# Patient Record
Sex: MALE | Race: WHITE | NOT HISPANIC OR LATINO | URBAN - METROPOLITAN AREA
[De-identification: names, ages, dates, MRNs, and addresses within clinical notes are randomized per-mention and may not be internally consistent; named-entity substitution may affect disease eponyms.]

---

## 2017-09-20 ENCOUNTER — NEW PATIENT (OUTPATIENT)
Dept: URBAN - METROPOLITAN AREA CLINIC 51 | Facility: CLINIC | Age: 67
End: 2017-09-20
Payer: MEDICARE

## 2017-09-20 DIAGNOSIS — H52.213 IRREGULAR ASTIGMATISM, BILATERAL: ICD-10-CM

## 2017-09-20 DIAGNOSIS — Z96.1 PRESENCE OF INTRAOCULAR LENS: ICD-10-CM

## 2017-09-20 DIAGNOSIS — H35.371 PUCKERING OF MACULA, RIGHT EYE: ICD-10-CM

## 2017-09-20 DIAGNOSIS — H40.1413 CAPSLR GLAUCOMA W/PSUEDOXF LENS, RIGHT EYE, SEVERE STAGE: Primary | ICD-10-CM

## 2017-09-20 DIAGNOSIS — H25.12 AGE-RELATED NUCLEAR CATARACT, LEFT EYE: ICD-10-CM

## 2017-09-20 DIAGNOSIS — H43.811 VITREOUS DEGENERATION, RIGHT EYE: ICD-10-CM

## 2017-09-20 PROCEDURE — 92025 CPTRIZED CORNEAL TOPOGRAPHY: CPT | Performed by: OPTOMETRIST

## 2017-09-20 PROCEDURE — 92004 COMPRE OPH EXAM NEW PT 1/>: CPT | Performed by: OPTOMETRIST

## 2017-09-20 PROCEDURE — 92133 CPTRZD OPH DX IMG PST SGM ON: CPT | Performed by: OPTOMETRIST

## 2017-09-20 ASSESSMENT — INTRAOCULAR PRESSURE
OS: 12
OD: 19

## 2017-09-20 ASSESSMENT — VISUAL ACUITY
OD: 20/20
OS: 20/20

## 2017-09-20 ASSESSMENT — KERATOMETRY
OD: 45.95
OS: 46.40

## 2017-10-19 ENCOUNTER — CONSULT (OUTPATIENT)
Dept: URBAN - METROPOLITAN AREA CLINIC 51 | Facility: CLINIC | Age: 67
End: 2017-10-19
Payer: MEDICARE

## 2017-10-19 PROCEDURE — 92014 COMPRE OPH EXAM EST PT 1/>: CPT | Performed by: OPHTHALMOLOGY

## 2017-10-19 PROCEDURE — 76514 ECHO EXAM OF EYE THICKNESS: CPT | Performed by: OPHTHALMOLOGY

## 2017-10-19 PROCEDURE — 92083 EXTENDED VISUAL FIELD XM: CPT | Performed by: OPHTHALMOLOGY

## 2017-10-19 ASSESSMENT — INTRAOCULAR PRESSURE
OS: 14
OD: 14

## 2018-03-21 ENCOUNTER — FOLLOW UP ESTABLISHED (OUTPATIENT)
Dept: URBAN - METROPOLITAN AREA CLINIC 51 | Facility: CLINIC | Age: 68
End: 2018-03-21
Payer: MEDICARE

## 2018-03-21 PROCEDURE — 92020 GONIOSCOPY: CPT | Performed by: OPHTHALMOLOGY

## 2018-03-21 PROCEDURE — 92012 INTRM OPH EXAM EST PATIENT: CPT | Performed by: OPHTHALMOLOGY

## 2018-03-21 ASSESSMENT — INTRAOCULAR PRESSURE
OD: 32
OS: 18

## 2018-03-23 ENCOUNTER — CONSULT (OUTPATIENT)
Dept: URBAN - METROPOLITAN AREA CLINIC 51 | Facility: CLINIC | Age: 68
End: 2018-03-23
Payer: MEDICARE

## 2018-03-23 DIAGNOSIS — H40.1133 PRIMARY OPEN-ANGLE GLAUCOMA, BILATERAL, SEVERE STAGE: Primary | ICD-10-CM

## 2018-03-23 PROCEDURE — 92014 COMPRE OPH EXAM EST PT 1/>: CPT | Performed by: OPHTHALMOLOGY

## 2018-03-23 ASSESSMENT — INTRAOCULAR PRESSURE
OD: 32
OS: 17

## 2018-03-27 ENCOUNTER — FOLLOW UP ESTABLISHED (OUTPATIENT)
Dept: URBAN - METROPOLITAN AREA CLINIC 51 | Facility: CLINIC | Age: 68
End: 2018-03-27
Payer: MEDICARE

## 2018-03-27 DIAGNOSIS — H57.11 OCULAR PAIN, RIGHT EYE: Primary | ICD-10-CM

## 2018-03-27 PROCEDURE — 92012 INTRM OPH EXAM EST PATIENT: CPT | Performed by: OPTOMETRIST

## 2018-03-27 RX ORDER — OFLOXACIN 3 MG/ML
0.3 % SOLUTION/ DROPS OPHTHALMIC
Qty: 1 | Refills: 0 | Status: INACTIVE
Start: 2018-03-27 | End: 2018-03-30

## 2018-03-27 RX ORDER — AMOXICILLIN AND CLAVULANATE POTASSIUM 500; 125 MG/1; 1/1
TABLET, FILM COATED ORAL
Qty: 14 | Refills: 0 | Status: INACTIVE
Start: 2018-03-27 | End: 2018-04-26

## 2018-03-30 ENCOUNTER — FOLLOW UP ESTABLISHED (OUTPATIENT)
Dept: URBAN - METROPOLITAN AREA CLINIC 51 | Facility: CLINIC | Age: 68
End: 2018-03-30
Payer: MEDICARE

## 2018-03-30 DIAGNOSIS — L03.213 PERIORBITAL CELLULITIS: Primary | ICD-10-CM

## 2018-03-30 PROCEDURE — 92012 INTRM OPH EXAM EST PATIENT: CPT | Performed by: OPHTHALMOLOGY

## 2018-03-30 ASSESSMENT — INTRAOCULAR PRESSURE
OS: 14
OD: 22

## 2018-04-26 ENCOUNTER — Encounter (OUTPATIENT)
Dept: URBAN - METROPOLITAN AREA CLINIC 51 | Facility: CLINIC | Age: 68
End: 2018-04-26
Payer: MEDICARE

## 2018-04-26 DIAGNOSIS — Z01.818 ENCOUNTER FOR OTHER PREPROCEDURAL EXAMINATION: Primary | ICD-10-CM

## 2018-04-26 PROCEDURE — 99213 OFFICE O/P EST LOW 20 MIN: CPT | Performed by: PHYSICIAN ASSISTANT

## 2018-05-02 ENCOUNTER — Encounter (OUTPATIENT)
Dept: URBAN - METROPOLITAN AREA CLINIC 51 | Facility: CLINIC | Age: 68
End: 2018-05-02
Payer: MEDICARE

## 2018-05-10 ENCOUNTER — SURGERY (OUTPATIENT)
Dept: URBAN - METROPOLITAN AREA SURGERY 19 | Facility: SURGERY | Age: 68
End: 2018-05-10
Payer: MEDICARE

## 2018-05-10 PROCEDURE — 66250 FOLLOW-UP SURGERY OF EYE: CPT | Performed by: OPHTHALMOLOGY

## 2018-05-11 ENCOUNTER — POST OP (OUTPATIENT)
Dept: URBAN - METROPOLITAN AREA CLINIC 10 | Facility: CLINIC | Age: 68
End: 2018-05-11

## 2018-05-11 PROCEDURE — 99024 POSTOP FOLLOW-UP VISIT: CPT | Performed by: OPHTHALMOLOGY

## 2018-05-11 ASSESSMENT — INTRAOCULAR PRESSURE
OS: 17
OD: 8

## 2018-05-18 ENCOUNTER — POST OP (OUTPATIENT)
Dept: URBAN - METROPOLITAN AREA CLINIC 51 | Facility: CLINIC | Age: 68
End: 2018-05-18
Payer: MEDICARE

## 2018-05-18 PROCEDURE — 92012 INTRM OPH EXAM EST PATIENT: CPT | Performed by: OPHTHALMOLOGY

## 2018-05-18 RX ORDER — BRIMONIDINE TARTRATE 1 MG/ML
0.1 % SOLUTION/ DROPS OPHTHALMIC
Qty: 3 | Refills: 1 | Status: INACTIVE
Start: 2018-05-18 | End: 2018-05-18

## 2018-05-18 RX ORDER — BRIMONIDINE TARTRATE 1 MG/ML
0.1 % SOLUTION/ DROPS OPHTHALMIC
Qty: 3 | Refills: 1 | Status: INACTIVE
Start: 2018-05-18 | End: 2019-06-12

## 2018-05-18 RX ORDER — BIMATOPROST 0.1 MG/ML
0.01 % SOLUTION/ DROPS OPHTHALMIC
Qty: 3 | Refills: 1 | Status: INACTIVE
Start: 2018-05-18 | End: 2019-06-12

## 2018-05-18 ASSESSMENT — INTRAOCULAR PRESSURE
OS: 16
OD: 4

## 2018-06-01 ENCOUNTER — FOLLOW UP ESTABLISHED (OUTPATIENT)
Dept: URBAN - METROPOLITAN AREA CLINIC 51 | Facility: CLINIC | Age: 68
End: 2018-06-01
Payer: MEDICARE

## 2018-06-01 PROCEDURE — 99024 POSTOP FOLLOW-UP VISIT: CPT | Performed by: OPHTHALMOLOGY

## 2018-06-01 ASSESSMENT — INTRAOCULAR PRESSURE
OD: 8
OS: 19

## 2018-06-14 ENCOUNTER — FOLLOW UP ESTABLISHED (OUTPATIENT)
Dept: URBAN - METROPOLITAN AREA CLINIC 44 | Facility: CLINIC | Age: 68
End: 2018-06-14

## 2018-06-14 PROCEDURE — 99024 POSTOP FOLLOW-UP VISIT: CPT | Performed by: OPHTHALMOLOGY

## 2018-06-14 ASSESSMENT — INTRAOCULAR PRESSURE
OD: 6
OD: 11
OS: 15

## 2018-06-22 ENCOUNTER — FOLLOW UP ESTABLISHED (OUTPATIENT)
Dept: URBAN - METROPOLITAN AREA CLINIC 51 | Facility: CLINIC | Age: 68
End: 2018-06-22
Payer: MEDICARE

## 2018-06-22 PROCEDURE — 99024 POSTOP FOLLOW-UP VISIT: CPT | Performed by: OPHTHALMOLOGY

## 2018-06-22 ASSESSMENT — INTRAOCULAR PRESSURE
OD: 7
OS: 12

## 2018-06-29 ENCOUNTER — FOLLOW UP ESTABLISHED (OUTPATIENT)
Dept: URBAN - METROPOLITAN AREA CLINIC 51 | Facility: CLINIC | Age: 68
End: 2018-06-29
Payer: MEDICARE

## 2018-06-29 PROCEDURE — 99024 POSTOP FOLLOW-UP VISIT: CPT | Performed by: OPHTHALMOLOGY

## 2018-07-09 ENCOUNTER — POST OP (OUTPATIENT)
Dept: URBAN - METROPOLITAN AREA CLINIC 44 | Facility: CLINIC | Age: 68
End: 2018-07-09

## 2018-07-09 PROCEDURE — 99024 POSTOP FOLLOW-UP VISIT: CPT | Performed by: OPHTHALMOLOGY

## 2018-07-09 ASSESSMENT — INTRAOCULAR PRESSURE
OS: 12
OD: 6

## 2018-08-10 ENCOUNTER — POST OP (OUTPATIENT)
Dept: URBAN - METROPOLITAN AREA CLINIC 51 | Facility: CLINIC | Age: 68
End: 2018-08-10
Payer: MEDICARE

## 2018-08-10 DIAGNOSIS — Z98.83 FILTERING (VITREOUS) BLEB AFTER GLAUCOMA SURGERY STATUS: Primary | ICD-10-CM

## 2018-08-10 PROCEDURE — 92083 EXTENDED VISUAL FIELD XM: CPT | Performed by: OPHTHALMOLOGY

## 2018-08-10 PROCEDURE — 99024 POSTOP FOLLOW-UP VISIT: CPT | Performed by: OPHTHALMOLOGY

## 2018-08-10 ASSESSMENT — INTRAOCULAR PRESSURE
OD: 7
OS: 12

## 2018-11-02 ENCOUNTER — FOLLOW UP ESTABLISHED (OUTPATIENT)
Dept: URBAN - METROPOLITAN AREA CLINIC 51 | Facility: CLINIC | Age: 68
End: 2018-11-02
Payer: MEDICARE

## 2018-11-02 PROCEDURE — 92012 INTRM OPH EXAM EST PATIENT: CPT | Performed by: OPHTHALMOLOGY

## 2018-11-02 PROCEDURE — 92083 EXTENDED VISUAL FIELD XM: CPT | Performed by: OPHTHALMOLOGY

## 2018-11-02 RX ORDER — DORZOLAMIDE HCL 20 MG/ML
2 % SOLUTION/ DROPS OPHTHALMIC
Qty: 1 | Refills: 5 | Status: INACTIVE
Start: 2018-11-02 | End: 2019-06-04

## 2018-11-02 ASSESSMENT — INTRAOCULAR PRESSURE
OS: 13
OD: 10

## 2019-02-01 ENCOUNTER — FOLLOW UP ESTABLISHED (OUTPATIENT)
Dept: URBAN - METROPOLITAN AREA CLINIC 51 | Facility: CLINIC | Age: 69
End: 2019-02-01
Payer: MEDICARE

## 2019-02-01 PROCEDURE — 92082 INTERMEDIATE VISUAL FIELD XM: CPT | Performed by: OPHTHALMOLOGY

## 2019-02-01 PROCEDURE — 92012 INTRM OPH EXAM EST PATIENT: CPT | Performed by: OPHTHALMOLOGY

## 2019-02-01 ASSESSMENT — VISUAL ACUITY
OD: 20/20
OS: 20/20

## 2019-02-01 ASSESSMENT — INTRAOCULAR PRESSURE
OS: 9
OD: 11

## 2019-06-04 ENCOUNTER — FOLLOW UP ESTABLISHED (OUTPATIENT)
Dept: URBAN - METROPOLITAN AREA CLINIC 44 | Facility: CLINIC | Age: 69
End: 2019-06-04
Payer: MEDICARE

## 2019-06-04 PROCEDURE — 92083 EXTENDED VISUAL FIELD XM: CPT | Performed by: OPHTHALMOLOGY

## 2019-06-04 PROCEDURE — 92014 COMPRE OPH EXAM EST PT 1/>: CPT | Performed by: OPHTHALMOLOGY

## 2019-06-04 RX ORDER — OFLOXACIN 3 MG/ML
0.3 % SOLUTION/ DROPS OPHTHALMIC
Qty: 1 | Refills: 1 | Status: INACTIVE
Start: 2019-06-04 | End: 2019-08-01

## 2019-06-04 RX ORDER — PREDNISOLONE ACETATE 10 MG/ML
1 % SUSPENSION/ DROPS OPHTHALMIC
Qty: 1 | Refills: 1 | Status: INACTIVE
Start: 2019-06-04 | End: 2019-06-12

## 2019-06-04 RX ORDER — DORZOLAMIDE HCL 20 MG/ML
2 % SOLUTION/ DROPS OPHTHALMIC
Qty: 1 | Refills: 5 | Status: INACTIVE
Start: 2019-06-04 | End: 2019-06-12

## 2019-06-04 ASSESSMENT — INTRAOCULAR PRESSURE
OS: 13
OD: 22

## 2019-06-04 ASSESSMENT — VISUAL ACUITY
OS: 20/25
OD: 20/20

## 2019-06-04 ASSESSMENT — KERATOMETRY
OD: 45.25
OS: 45.38

## 2019-06-07 ENCOUNTER — Encounter (OUTPATIENT)
Dept: URBAN - METROPOLITAN AREA CLINIC 44 | Facility: CLINIC | Age: 69
End: 2019-06-07
Payer: MEDICARE

## 2019-06-07 PROCEDURE — 99213 OFFICE O/P EST LOW 20 MIN: CPT | Performed by: PHYSICIAN ASSISTANT

## 2019-06-11 ENCOUNTER — SURGERY (OUTPATIENT)
Dept: URBAN - METROPOLITAN AREA SURGERY 5 | Facility: SURGERY | Age: 69
End: 2019-06-11
Payer: MEDICARE

## 2019-06-11 PROCEDURE — 66250 FOLLOW-UP SURGERY OF EYE: CPT | Performed by: OPHTHALMOLOGY

## 2019-06-11 PROCEDURE — 66180 AQUEOUS SHUNT EYE W/GRAFT: CPT | Performed by: OPHTHALMOLOGY

## 2019-06-12 ENCOUNTER — POST OP (OUTPATIENT)
Dept: URBAN - METROPOLITAN AREA CLINIC 10 | Facility: CLINIC | Age: 69
End: 2019-06-12
Payer: MEDICARE

## 2019-06-12 PROCEDURE — 92071 CONTACT LENS FITTING FOR TX: CPT | Performed by: OPHTHALMOLOGY

## 2019-06-12 PROCEDURE — 99024 POSTOP FOLLOW-UP VISIT: CPT | Performed by: OPHTHALMOLOGY

## 2019-06-12 RX ORDER — DORZOLAMIDE HCL 20 MG/ML
2 % SOLUTION/ DROPS OPHTHALMIC
Qty: 1 | Refills: 5 | Status: INACTIVE
Start: 2019-06-12 | End: 2019-12-03

## 2019-06-12 RX ORDER — BRIMONIDINE TARTRATE 1 MG/ML
0.1 % SOLUTION/ DROPS OPHTHALMIC
Qty: 3 | Refills: 1 | Status: INACTIVE
Start: 2019-06-12 | End: 2019-08-01

## 2019-06-12 RX ORDER — BIMATOPROST 0.1 MG/ML
0.01 % SOLUTION/ DROPS OPHTHALMIC
Qty: 3 | Refills: 1 | Status: INACTIVE
Start: 2019-06-12 | End: 2019-07-16

## 2019-06-12 ASSESSMENT — INTRAOCULAR PRESSURE
OS: 15
OD: 10

## 2019-06-18 ENCOUNTER — POST OP (OUTPATIENT)
Dept: URBAN - METROPOLITAN AREA CLINIC 44 | Facility: CLINIC | Age: 69
End: 2019-06-18

## 2019-06-18 PROCEDURE — 99024 POSTOP FOLLOW-UP VISIT: CPT | Performed by: OPHTHALMOLOGY

## 2019-06-18 ASSESSMENT — VISUAL ACUITY: OD: 20/50

## 2019-06-24 ENCOUNTER — FOLLOW UP ESTABLISHED (OUTPATIENT)
Dept: URBAN - METROPOLITAN AREA CLINIC 44 | Facility: CLINIC | Age: 69
End: 2019-06-24

## 2019-06-24 PROCEDURE — 99024 POSTOP FOLLOW-UP VISIT: CPT | Performed by: OPHTHALMOLOGY

## 2019-06-24 ASSESSMENT — INTRAOCULAR PRESSURE
OD: 6
OS: 10

## 2019-07-08 ENCOUNTER — POST OP (OUTPATIENT)
Dept: URBAN - METROPOLITAN AREA CLINIC 44 | Facility: CLINIC | Age: 69
End: 2019-07-08

## 2019-07-08 PROCEDURE — 99024 POSTOP FOLLOW-UP VISIT: CPT | Performed by: OPHTHALMOLOGY

## 2019-07-08 ASSESSMENT — INTRAOCULAR PRESSURE
OS: 11
OD: 35

## 2019-07-16 ENCOUNTER — POST OP (OUTPATIENT)
Dept: URBAN - METROPOLITAN AREA CLINIC 44 | Facility: CLINIC | Age: 69
End: 2019-07-16

## 2019-07-16 PROCEDURE — 99024 POSTOP FOLLOW-UP VISIT: CPT | Performed by: OPHTHALMOLOGY

## 2019-07-16 RX ORDER — BIMATOPROST 0.1 MG/ML
0.01 % SOLUTION/ DROPS OPHTHALMIC
Qty: 3 | Refills: 1 | Status: INACTIVE
Start: 2019-07-16 | End: 2019-12-03

## 2019-07-16 ASSESSMENT — INTRAOCULAR PRESSURE
OS: 14
OD: 36

## 2019-07-18 ENCOUNTER — FOLLOW UP ESTABLISHED (OUTPATIENT)
Dept: URBAN - METROPOLITAN AREA CLINIC 44 | Facility: CLINIC | Age: 69
End: 2019-07-18

## 2019-07-18 PROCEDURE — 99024 POSTOP FOLLOW-UP VISIT: CPT | Performed by: OPHTHALMOLOGY

## 2019-07-18 ASSESSMENT — INTRAOCULAR PRESSURE
OD: 14
OS: 14

## 2019-08-01 ENCOUNTER — FOLLOW UP ESTABLISHED (OUTPATIENT)
Dept: URBAN - METROPOLITAN AREA CLINIC 44 | Facility: CLINIC | Age: 69
End: 2019-08-01

## 2019-08-01 DIAGNOSIS — Z48.89 ENCOUNTER FOR OTHER SPECIFIED SURGICAL AFTERCARE: Primary | ICD-10-CM

## 2019-08-01 PROCEDURE — 99024 POSTOP FOLLOW-UP VISIT: CPT | Performed by: OPHTHALMOLOGY

## 2019-08-01 RX ORDER — NETARSUDIL 0.2 MG/ML
0.02 % SOLUTION/ DROPS OPHTHALMIC; TOPICAL
Qty: 1 | Refills: 5 | Status: INACTIVE
Start: 2019-08-01 | End: 2019-12-03

## 2019-08-01 RX ORDER — BRIMONIDINE TARTRATE, TIMOLOL MALEATE 2; 5 MG/ML; MG/ML
SOLUTION/ DROPS OPHTHALMIC
Qty: 3 | Refills: 1 | Status: INACTIVE
Start: 2019-08-01 | End: 2019-10-03

## 2019-08-01 RX ORDER — PREDNISOLONE ACETATE 10 MG/ML
1 % SUSPENSION/ DROPS OPHTHALMIC
Qty: 1 | Refills: 1 | Status: INACTIVE
Start: 2019-08-01 | End: 2019-09-10

## 2019-08-01 ASSESSMENT — INTRAOCULAR PRESSURE
OS: 16
OD: 18

## 2019-08-15 ENCOUNTER — FOLLOW UP ESTABLISHED (OUTPATIENT)
Dept: URBAN - METROPOLITAN AREA CLINIC 44 | Facility: CLINIC | Age: 69
End: 2019-08-15

## 2019-08-15 PROCEDURE — 99024 POSTOP FOLLOW-UP VISIT: CPT | Performed by: OPHTHALMOLOGY

## 2019-08-15 ASSESSMENT — INTRAOCULAR PRESSURE
OD: 18
OS: 12

## 2019-08-27 ENCOUNTER — POST OP (OUTPATIENT)
Dept: URBAN - METROPOLITAN AREA CLINIC 44 | Facility: CLINIC | Age: 69
End: 2019-08-27

## 2019-08-27 PROCEDURE — 99024 POSTOP FOLLOW-UP VISIT: CPT | Performed by: OPHTHALMOLOGY

## 2019-08-27 ASSESSMENT — INTRAOCULAR PRESSURE
OD: 14
OS: 11

## 2019-09-06 ENCOUNTER — FOLLOW UP ESTABLISHED (OUTPATIENT)
Dept: URBAN - METROPOLITAN AREA CLINIC 44 | Facility: CLINIC | Age: 69
End: 2019-09-06
Payer: MEDICARE

## 2019-09-06 PROCEDURE — 92012 INTRM OPH EXAM EST PATIENT: CPT | Performed by: OPTOMETRIST

## 2019-09-06 ASSESSMENT — INTRAOCULAR PRESSURE
OS: 16
OD: 22

## 2019-09-10 ENCOUNTER — FOLLOW UP ESTABLISHED (OUTPATIENT)
Dept: URBAN - METROPOLITAN AREA CLINIC 44 | Facility: CLINIC | Age: 69
End: 2019-09-10
Payer: MEDICARE

## 2019-09-10 DIAGNOSIS — H20.011 PRIMARY IRIDOCYCLITIS, RIGHT EYE: Primary | ICD-10-CM

## 2019-09-10 PROCEDURE — 92012 INTRM OPH EXAM EST PATIENT: CPT | Performed by: OPHTHALMOLOGY

## 2019-09-10 RX ORDER — PREDNISOLONE ACETATE 10 MG/ML
1 % SUSPENSION/ DROPS OPHTHALMIC
Qty: 1 | Refills: 1 | Status: INACTIVE
Start: 2019-09-10 | End: 2019-12-03

## 2019-09-10 ASSESSMENT — INTRAOCULAR PRESSURE
OD: 22
OS: 11

## 2019-10-03 ENCOUNTER — FOLLOW UP ESTABLISHED (OUTPATIENT)
Dept: URBAN - METROPOLITAN AREA CLINIC 44 | Facility: CLINIC | Age: 69
End: 2019-10-03
Payer: MEDICARE

## 2019-10-03 PROCEDURE — 92012 INTRM OPH EXAM EST PATIENT: CPT | Performed by: OPHTHALMOLOGY

## 2019-10-03 PROCEDURE — 92083 EXTENDED VISUAL FIELD XM: CPT | Performed by: OPHTHALMOLOGY

## 2019-10-03 RX ORDER — BRIMONIDINE TARTRATE, TIMOLOL MALEATE 2; 5 MG/ML; MG/ML
SOLUTION/ DROPS OPHTHALMIC
Qty: 3 | Refills: 1 | Status: INACTIVE
Start: 2019-10-03 | End: 2019-12-03

## 2019-10-03 ASSESSMENT — INTRAOCULAR PRESSURE
OD: 19
OS: 12

## 2019-12-03 ENCOUNTER — FOLLOW UP ESTABLISHED (OUTPATIENT)
Dept: URBAN - METROPOLITAN AREA CLINIC 44 | Facility: CLINIC | Age: 69
End: 2019-12-03
Payer: MEDICARE

## 2019-12-03 PROCEDURE — 92082 INTERMEDIATE VISUAL FIELD XM: CPT | Performed by: OPHTHALMOLOGY

## 2019-12-03 PROCEDURE — 92012 INTRM OPH EXAM EST PATIENT: CPT | Performed by: OPHTHALMOLOGY

## 2019-12-03 RX ORDER — TIMOLOL MALEATE 5 MG/ML
0.5 % SOLUTION/ DROPS OPHTHALMIC
Qty: 1 | Refills: 5 | Status: INACTIVE
Start: 2019-12-03 | End: 2020-08-03

## 2019-12-03 RX ORDER — BIMATOPROST 0.1 MG/ML
0.01 % SOLUTION/ DROPS OPHTHALMIC
Qty: 3 | Refills: 1 | Status: INACTIVE
Start: 2019-12-03 | End: 2020-08-03

## 2019-12-03 RX ORDER — DORZOLAMIDE HCL 20 MG/ML
2 % SOLUTION/ DROPS OPHTHALMIC
Qty: 1 | Refills: 5 | Status: INACTIVE
Start: 2019-12-03 | End: 2020-08-03

## 2019-12-03 RX ORDER — BRIMONIDINE TARTRATE 2 MG/ML
0.2 % SOLUTION/ DROPS OPHTHALMIC
Qty: 1 | Refills: 5 | Status: INACTIVE
Start: 2019-12-03 | End: 2020-08-03

## 2019-12-03 ASSESSMENT — INTRAOCULAR PRESSURE
OS: 10
OD: 21

## 2020-04-30 ENCOUNTER — FOLLOW UP ESTABLISHED (OUTPATIENT)
Dept: URBAN - METROPOLITAN AREA CLINIC 44 | Facility: CLINIC | Age: 70
End: 2020-04-30
Payer: MEDICARE

## 2020-04-30 PROCEDURE — 92012 INTRM OPH EXAM EST PATIENT: CPT | Performed by: OPTOMETRIST

## 2020-04-30 ASSESSMENT — INTRAOCULAR PRESSURE
OD: 19
OS: 10
OS: 11

## 2020-05-04 ENCOUNTER — CONSULT (OUTPATIENT)
Dept: URBAN - METROPOLITAN AREA CLINIC 44 | Facility: CLINIC | Age: 70
End: 2020-05-04
Payer: MEDICARE

## 2020-05-04 PROCEDURE — 92014 COMPRE OPH EXAM EST PT 1/>: CPT | Performed by: OPHTHALMOLOGY

## 2020-05-04 ASSESSMENT — INTRAOCULAR PRESSURE
OD: 14
OS: 18

## 2020-08-03 ENCOUNTER — FOLLOW UP ESTABLISHED (OUTPATIENT)
Dept: URBAN - METROPOLITAN AREA CLINIC 44 | Facility: CLINIC | Age: 70
End: 2020-08-03
Payer: MEDICARE

## 2020-08-03 PROCEDURE — 92083 EXTENDED VISUAL FIELD XM: CPT | Performed by: OPHTHALMOLOGY

## 2020-08-03 PROCEDURE — 92014 COMPRE OPH EXAM EST PT 1/>: CPT | Performed by: OPHTHALMOLOGY

## 2020-08-03 RX ORDER — BIMATOPROST 0.1 MG/ML
0.01 % SOLUTION/ DROPS OPHTHALMIC
Qty: 3 | Refills: 1 | Status: INACTIVE
Start: 2020-08-03 | End: 2020-11-30

## 2020-08-03 RX ORDER — BRIMONIDINE TARTRATE 2 MG/ML
0.2 % SOLUTION/ DROPS OPHTHALMIC
Qty: 1 | Refills: 5 | Status: INACTIVE
Start: 2020-08-03 | End: 2020-11-30

## 2020-08-03 RX ORDER — DORZOLAMIDE HCL 20 MG/ML
2 % SOLUTION/ DROPS OPHTHALMIC
Qty: 1 | Refills: 5 | Status: INACTIVE
Start: 2020-08-03 | End: 2020-11-30

## 2020-08-03 RX ORDER — TIMOLOL MALEATE 5 MG/ML
0.5 % SOLUTION/ DROPS OPHTHALMIC
Qty: 1 | Refills: 5 | Status: INACTIVE
Start: 2020-08-03 | End: 2020-11-30

## 2020-08-03 ASSESSMENT — INTRAOCULAR PRESSURE
OD: 19
OS: 11

## 2020-11-30 ENCOUNTER — FOLLOW UP ESTABLISHED (OUTPATIENT)
Dept: URBAN - METROPOLITAN AREA CLINIC 44 | Facility: CLINIC | Age: 70
End: 2020-11-30
Payer: MEDICARE

## 2020-11-30 PROCEDURE — 92083 EXTENDED VISUAL FIELD XM: CPT | Performed by: OPHTHALMOLOGY

## 2020-11-30 PROCEDURE — 92012 INTRM OPH EXAM EST PATIENT: CPT | Performed by: OPHTHALMOLOGY

## 2020-11-30 RX ORDER — TIMOLOL MALEATE 5 MG/ML
0.5 % SOLUTION/ DROPS OPHTHALMIC
Qty: 15 | Refills: 1 | Status: INACTIVE
Start: 2020-11-30 | End: 2021-02-25

## 2020-11-30 RX ORDER — BRIMONIDINE TARTRATE 2 MG/ML
0.2 % SOLUTION/ DROPS OPHTHALMIC
Qty: 15 | Refills: 1 | Status: INACTIVE
Start: 2020-11-30 | End: 2021-02-25

## 2020-11-30 RX ORDER — DORZOLAMIDE HCL 20 MG/ML
2 % SOLUTION/ DROPS OPHTHALMIC
Qty: 15 | Refills: 1 | Status: INACTIVE
Start: 2020-11-30 | End: 2021-02-25

## 2020-11-30 RX ORDER — BIMATOPROST 0.1 MG/ML
0.01 % SOLUTION/ DROPS OPHTHALMIC
Qty: 7.5 | Refills: 1 | Status: INACTIVE
Start: 2020-11-30 | End: 2021-02-25

## 2020-11-30 ASSESSMENT — INTRAOCULAR PRESSURE
OD: 20
OS: 11

## 2020-11-30 ASSESSMENT — VISUAL ACUITY
OS: 20/30
OD: 20/30

## 2021-02-25 ENCOUNTER — FOLLOW UP ESTABLISHED (OUTPATIENT)
Dept: URBAN - METROPOLITAN AREA CLINIC 44 | Facility: CLINIC | Age: 71
End: 2021-02-25
Payer: MEDICARE

## 2021-02-25 PROCEDURE — 99212 OFFICE O/P EST SF 10 MIN: CPT | Performed by: OPHTHALMOLOGY

## 2021-02-25 RX ORDER — BRIMONIDINE TARTRATE 2 MG/ML
0.2 % SOLUTION/ DROPS OPHTHALMIC
Qty: 15 | Refills: 1 | Status: INACTIVE
Start: 2021-02-25 | End: 2021-07-08

## 2021-02-25 RX ORDER — BIMATOPROST 0.1 MG/ML
0.01 % SOLUTION/ DROPS OPHTHALMIC
Qty: 7.5 | Refills: 1 | Status: INACTIVE
Start: 2021-02-25 | End: 2021-07-08

## 2021-02-25 RX ORDER — DORZOLAMIDE HCL 20 MG/ML
2 % SOLUTION/ DROPS OPHTHALMIC
Qty: 15 | Refills: 1 | Status: INACTIVE
Start: 2021-02-25 | End: 2021-07-08

## 2021-02-25 RX ORDER — TIMOLOL MALEATE 5 MG/ML
0.5 % SOLUTION/ DROPS OPHTHALMIC
Qty: 15 | Refills: 1 | Status: INACTIVE
Start: 2021-02-25 | End: 2021-07-08

## 2021-02-25 ASSESSMENT — INTRAOCULAR PRESSURE
OD: 21
OS: 12

## 2021-07-08 ENCOUNTER — OFFICE VISIT (OUTPATIENT)
Dept: URBAN - METROPOLITAN AREA CLINIC 44 | Facility: CLINIC | Age: 71
End: 2021-07-08
Payer: MEDICARE

## 2021-07-08 PROCEDURE — 99214 OFFICE O/P EST MOD 30 MIN: CPT | Performed by: OPHTHALMOLOGY

## 2021-07-08 PROCEDURE — 92083 EXTENDED VISUAL FIELD XM: CPT | Performed by: OPHTHALMOLOGY

## 2021-07-08 RX ORDER — PREDNISOLONE ACETATE 10 MG/ML
1 % SUSPENSION/ DROPS OPHTHALMIC
Qty: 10 | Refills: 1 | Status: INACTIVE
Start: 2021-07-08 | End: 2021-07-08

## 2021-07-08 RX ORDER — DORZOLAMIDE HCL 20 MG/ML
2 % SOLUTION/ DROPS OPHTHALMIC
Qty: 15 | Refills: 1 | Status: INACTIVE
Start: 2021-07-08 | End: 2021-11-04

## 2021-07-08 RX ORDER — BRIMONIDINE TARTRATE 2 MG/ML
0.2 % SOLUTION/ DROPS OPHTHALMIC
Qty: 15 | Refills: 1 | Status: INACTIVE
Start: 2021-07-08 | End: 2021-11-04

## 2021-07-08 RX ORDER — TIMOLOL MALEATE 5 MG/ML
0.5 % SOLUTION/ DROPS OPHTHALMIC
Qty: 15 | Refills: 1 | Status: INACTIVE
Start: 2021-07-08 | End: 2021-11-04

## 2021-07-08 RX ORDER — BIMATOPROST 0.1 MG/ML
0.01 % SOLUTION/ DROPS OPHTHALMIC
Qty: 7.5 | Refills: 1 | Status: INACTIVE
Start: 2021-07-08 | End: 2021-11-02

## 2021-07-08 ASSESSMENT — INTRAOCULAR PRESSURE
OD: 27
OS: 14

## 2021-07-08 NOTE — IMPRESSION/PLAN
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 Plan: VF stable but IOP up OD. Recommend MicroPulse MP3 Laser. RvBvA discussed with patient, including pain, bleeding, loss of vision, loss of eye, need for further surgery, double vision, retinal problems, infection, and inflammation. Although surgery is expected to improve the condition, the patient understands that there is a chance that the condition could worsen in the future. The procedure is being done in an attempt to preserve the current level of vision. Vision improvement is NOT expected. Explained to patient the procedure has about a 50% success rate and additional procedures may be necessary. Patient advised that ongoing uncontrolled glaucoma may result in permanent vision loss. All questions answered. The patient wishes to proceed with the laser. 
- Continue glaucoma medications as directed: Lumigan QHS OD, Timolol QAM OD, Dorzolamide BID OU and Brimonidine BID OD
- See [address] to schedule, H&P, MicroPulse Laser MP3 in the Right Eye, H&P, POD1 in general clinic and POW1&POM1 with Dr. Brunner. - PO instructions: Patient to continue all glaucoma drops as instructed by Dr. Brunner and start Prednisolone [id number], [id number], AVOn8gv and QDx1wk.

## 2021-07-09 ENCOUNTER — ADULT PHYSICAL (OUTPATIENT)
Dept: URBAN - METROPOLITAN AREA CLINIC 44 | Facility: CLINIC | Age: 71
End: 2021-07-09
Payer: MEDICARE

## 2021-07-09 PROCEDURE — 99213 OFFICE O/P EST LOW 20 MIN: CPT | Performed by: PHYSICIAN ASSISTANT

## 2021-07-19 ENCOUNTER — SURGERY (OUTPATIENT)
Dept: URBAN - METROPOLITAN AREA SURGERY 19 | Facility: SURGERY | Age: 71
End: 2021-07-19
Payer: MEDICARE

## 2021-07-19 PROCEDURE — 66170 GLAUCOMA SURGERY: CPT | Performed by: OPHTHALMOLOGY

## 2021-07-20 ENCOUNTER — POST-OPERATIVE VISIT (OUTPATIENT)
Dept: URBAN - METROPOLITAN AREA CLINIC 44 | Facility: CLINIC | Age: 71
End: 2021-07-20

## 2021-07-20 PROCEDURE — 99024 POSTOP FOLLOW-UP VISIT: CPT | Performed by: OPTOMETRIST

## 2021-07-20 ASSESSMENT — INTRAOCULAR PRESSURE: OD: 22

## 2021-07-20 NOTE — IMPRESSION/PLAN
Impression: S/P Diode Micropulse Laser Cyclophotocoagulation OD - 1 Day. Encounter for surgical aftercare following surgery on a sense organ  Z48.810.  Plan: continue all gtts

## 2021-07-26 ENCOUNTER — POST-OPERATIVE VISIT (OUTPATIENT)
Dept: URBAN - METROPOLITAN AREA CLINIC 44 | Facility: CLINIC | Age: 71
End: 2021-07-26
Payer: MEDICARE

## 2021-07-26 DIAGNOSIS — Z48.810 ENCOUNTER FOR SURGICAL AFTERCARE FOLLOWING SURGERY ON THE SENSE ORGANS: ICD-10-CM

## 2021-07-26 PROCEDURE — 99024 POSTOP FOLLOW-UP VISIT: CPT | Performed by: OPHTHALMOLOGY

## 2021-07-26 ASSESSMENT — INTRAOCULAR PRESSURE
OS: 14
OD: 21

## 2021-07-26 NOTE — IMPRESSION/PLAN
Impression: S/P Diode Micropulse Laser Cyclophotocoagulation OD - 7 Days. Encounter for other specified surgical aftercare  Z48.89. Plan: - Patient doing well. No activity restrictions. - Drop Instructions: TAPER Prednisolone TID x 1wk, BID x 1wk, QD x 1wk, then stop, CONTINUE  Lumigan QHS OD, Timolol QAM OD, Dorzolamide BID OU and Brimonidine BID OD.
- Return to clinic as scheduled.

## 2021-08-23 ENCOUNTER — POST-OPERATIVE VISIT (OUTPATIENT)
Dept: URBAN - METROPOLITAN AREA CLINIC 44 | Facility: CLINIC | Age: 71
End: 2021-08-23
Payer: MEDICARE

## 2021-08-23 PROCEDURE — 99024 POSTOP FOLLOW-UP VISIT: CPT | Performed by: OPHTHALMOLOGY

## 2021-08-23 ASSESSMENT — INTRAOCULAR PRESSURE
OD: 21
OS: 15

## 2021-08-23 NOTE — IMPRESSION/PLAN
Impression: S/P Diode Micropulse Laser Cyclophotocoagulation OD - 35 Days. Encounter for surgical aftercare following surgery on a sense organ  Z48.810. Plan: - Patient doing well.  
- Drop Instructions: CONTINUE  Lumigan QHS OD, Timolol QAM OD, Dorzolamide BID OU and Brimonidine BID OD.
- RTC in November for VF 10-2, DFE (tech to check IOP and dilate), NO OCT
Patient

## 2021-11-02 ENCOUNTER — OFFICE VISIT (OUTPATIENT)
Dept: URBAN - METROPOLITAN AREA CLINIC 44 | Facility: CLINIC | Age: 71
End: 2021-11-02
Payer: MEDICARE

## 2021-11-02 PROCEDURE — 92083 EXTENDED VISUAL FIELD XM: CPT | Performed by: OPHTHALMOLOGY

## 2021-11-02 PROCEDURE — 99212 OFFICE O/P EST SF 10 MIN: CPT | Performed by: OPHTHALMOLOGY

## 2021-11-02 RX ORDER — BIMATOPROST 0.1 MG/ML
0.01 % SOLUTION/ DROPS OPHTHALMIC
Qty: 7.5 | Refills: 1 | Status: INACTIVE
Start: 2021-11-02 | End: 2022-01-03

## 2021-11-02 ASSESSMENT — INTRAOCULAR PRESSURE
OD: 21
OS: 19

## 2021-11-02 NOTE — IMPRESSION/PLAN
Impression: Capslr glaucoma w/psuedoxf lens, bilateral, severe stage Plan: IOP OD stable, OS is a bit up, will add Lumigan to help lower IOP. Change Lumigan QHS to OU (from OD). Continue  Timolol QAM OD, Brimonidine BID OD, and Dorzolamide BID OU. Start Olopatadine QD OD to help with irritation RTC 2 months IOP with Dr Antoni Cobos or Wander Davis

## 2021-11-17 ENCOUNTER — OFFICE VISIT (OUTPATIENT)
Dept: URBAN - METROPOLITAN AREA CLINIC 44 | Facility: CLINIC | Age: 71
End: 2021-11-17
Payer: MEDICARE

## 2021-11-17 DIAGNOSIS — H52.4 PRESBYOPIA: Primary | ICD-10-CM

## 2021-11-17 ASSESSMENT — VISUAL ACUITY
OS: 20/30
OD: 20/30

## 2021-11-17 ASSESSMENT — KERATOMETRY
OD: 46.38
OS: 45.50

## 2022-01-03 ENCOUNTER — OFFICE VISIT (OUTPATIENT)
Dept: URBAN - METROPOLITAN AREA CLINIC 44 | Facility: CLINIC | Age: 72
End: 2022-01-03
Payer: MEDICARE

## 2022-01-03 DIAGNOSIS — H40.1433 CAPSULAR GLAUCOMA WITH PSEUDOEXFOLIATION OF LENS, BILATERAL, SEVERE STAGE: Primary | ICD-10-CM

## 2022-01-03 PROCEDURE — 99213 OFFICE O/P EST LOW 20 MIN: CPT | Performed by: OPTOMETRIST

## 2022-01-03 RX ORDER — DORZOLAMIDE HCL 20 MG/ML
2 % SOLUTION/ DROPS OPHTHALMIC
Qty: 15 | Refills: 3 | Status: ACTIVE
Start: 2022-01-03

## 2022-01-03 RX ORDER — OLOPATADINE HYDROCHLORIDE OPHTHALMIC 2 MG/ML
0.2 % SOLUTION OPHTHALMIC
Qty: 10 | Refills: 5 | Status: INACTIVE
Start: 2022-01-03 | End: 2022-04-06

## 2022-01-03 RX ORDER — BIMATOPROST 0.1 MG/ML
0.01 % SOLUTION/ DROPS OPHTHALMIC
Qty: 7.5 | Refills: 3 | Status: ACTIVE
Start: 2022-01-03

## 2022-01-03 RX ORDER — OLOPATADINE HYDROCHLORIDE OPHTHALMIC 2 MG/ML
0.2 % SOLUTION OPHTHALMIC
Qty: 10 | Refills: 5 | Status: INACTIVE
Start: 2022-01-03 | End: 2022-01-03

## 2022-01-03 RX ORDER — TIMOLOL MALEATE 5 MG/ML
0.5 % SOLUTION/ DROPS OPHTHALMIC
Qty: 15 | Refills: 3 | Status: ACTIVE
Start: 2022-01-03

## 2022-01-03 RX ORDER — BRIMONIDINE TARTRATE 2 MG/ML
0.2 % SOLUTION/ DROPS OPHTHALMIC
Qty: 15 | Refills: 3 | Status: ACTIVE
Start: 2022-01-03

## 2022-01-03 ASSESSMENT — INTRAOCULAR PRESSURE
OD: 21
OS: 15
OS: 14
OD: 20

## 2022-01-03 NOTE — IMPRESSION/PLAN
Impression: Capslr glaucoma w/psuedoxf lens, bilateral, severe stage Plan: IOP slightly elevated but stable OD since last visit with Dr. Serenity Jaimes (hx of trab/revision and micropulse), improved OS. Will continue Lumigan QHS OU, timolol QAM OD, brimonidine BID OD, dorzolamide BID OU.   
RTC 3 months for IOP check + 24-2 HVF

## 2022-04-06 ENCOUNTER — OFFICE VISIT (OUTPATIENT)
Dept: URBAN - METROPOLITAN AREA CLINIC 44 | Facility: CLINIC | Age: 72
End: 2022-04-06
Payer: MEDICARE

## 2022-04-06 PROCEDURE — 99214 OFFICE O/P EST MOD 30 MIN: CPT | Performed by: OPTOMETRIST

## 2022-04-06 ASSESSMENT — INTRAOCULAR PRESSURE
OS: 26
OD: 27
OD: 28
OS: 15

## 2022-04-06 NOTE — IMPRESSION/PLAN
Impression: Capslr glaucoma w/psuedoxf lens, bilateral, severe stage Plan: IOP elevated OD, okay OS Unable to get VF today Will continue Lumigan QHS OU, timolol QAM OD, brimonidine BID OD, dorzolamide BID OU -- patient reports good compliance See Glaucoma again for further evaluation

## 2022-06-06 ENCOUNTER — OFFICE VISIT (OUTPATIENT)
Dept: URBAN - METROPOLITAN AREA CLINIC 44 | Facility: CLINIC | Age: 72
End: 2022-06-06
Payer: MEDICARE

## 2022-06-06 DIAGNOSIS — D48.1 NEOPLASM OF UNCERTIAN BEHAVIOR OF EYELID: ICD-10-CM

## 2022-06-06 PROCEDURE — 99214 OFFICE O/P EST MOD 30 MIN: CPT | Performed by: OPHTHALMOLOGY

## 2022-06-06 PROCEDURE — 92083 EXTENDED VISUAL FIELD XM: CPT | Performed by: OPHTHALMOLOGY

## 2022-06-06 RX ORDER — BRIMONIDINE TARTRATE 2 MG/ML
0.2 % SOLUTION/ DROPS OPHTHALMIC
Qty: 15 | Refills: 3 | Status: ACTIVE
Start: 2022-06-06

## 2022-06-06 RX ORDER — DORZOLAMIDE HCL 20 MG/ML
2 % SOLUTION/ DROPS OPHTHALMIC
Qty: 15 | Refills: 3 | Status: ACTIVE
Start: 2022-06-06

## 2022-06-06 RX ORDER — BIMATOPROST 0.1 MG/ML
0.01 % SOLUTION/ DROPS OPHTHALMIC
Qty: 7.5 | Refills: 3 | Status: ACTIVE
Start: 2022-06-06

## 2022-06-06 RX ORDER — TIMOLOL MALEATE 5 MG/ML
0.5 % SOLUTION/ DROPS OPHTHALMIC
Qty: 15 | Refills: 3 | Status: ACTIVE
Start: 2022-06-06

## 2022-06-06 RX ORDER — OFLOXACIN 3 MG/ML
0.3 % SOLUTION/ DROPS OPHTHALMIC
Qty: 5 | Refills: 1 | Status: ACTIVE
Start: 2022-06-06

## 2022-06-06 RX ORDER — PREDNISOLONE ACETATE 10 MG/ML
1 % SUSPENSION/ DROPS OPHTHALMIC
Qty: 10 | Refills: 1 | Status: ACTIVE
Start: 2022-06-06

## 2022-06-06 ASSESSMENT — INTRAOCULAR PRESSURE
OS: 19
OD: 28

## 2022-06-06 NOTE — IMPRESSION/PLAN
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 Plan: Multiple lid lesions on BUL. Recommend patient to see a dermatologist to have them examined.

## 2022-06-06 NOTE — IMPRESSION/PLAN
Impression: Capslr glaucoma w/psuedoxf lens, bilateral, severe stage Plan: Visual field appears stable OU, but IOP OD is elevated OD. Recommend lowering IOP OD. Baerveldt Tube Shunt is recommended. Risks, benefits, alternatives to surgery discussed including pain, bleeding, loss of vision, loss of eye, need for further surgery, double vision, retinal problems, infection, and inflammation. Although surgery is expected to improve the condition, the patient understands it is possible that the condition could worsen in the future. The surgery is being done in an attempt to preserve the current level of vision; vision improvement is NOT expected. Glasses will be necessary for sharpest vision after surgery. Patient advised that ongoing uncontrolled glaucoma may result in permanent vision loss. Discussed in detail the commitment of post operative care, patient may need to travel to different offices. Discussed activity restrictions: no bending head below waist, rubbing the eye, straining or lifting over 10 lbs, stay out of deirdre, dirty areas and shield when sleeping until advised. All questions answered. The patient wishes to proceed with surgery. See Sunita Baca to schedule Baerveldt tube shunt in the RIGHT eye, H&P, AScan [if required],  POD1, POW1, POW3 and POW5.5-6.5.

- Educational material provided. - ERx'd Ofloxacin QID OD, and Prednisolone Acetate QID OD as requested. - Continue Glaucoma Drops: 
- Patient will continue glaucoma drops as directed until surgery. After surgery, patient will stop glaucoma drops ONLY in the surgical eye. Start surgical drops when eye patch is removed 5hrs after surgery.

## 2022-06-10 ENCOUNTER — ADULT PHYSICAL (OUTPATIENT)
Dept: URBAN - METROPOLITAN AREA CLINIC 44 | Facility: CLINIC | Age: 72
End: 2022-06-10
Payer: MEDICARE

## 2022-06-10 DIAGNOSIS — H40.1433 CAPSULAR GLAUCOMA WITH PSEUDOEXFOLIATION OF LENS, BILATERAL, SEVERE STAGE: ICD-10-CM

## 2022-06-10 DIAGNOSIS — Z01.818 ENCOUNTER FOR OTHER PREPROCEDURAL EXAMINATION: Primary | ICD-10-CM

## 2022-06-10 PROCEDURE — 99213 OFFICE O/P EST LOW 20 MIN: CPT | Performed by: PHYSICIAN ASSISTANT

## 2022-06-27 ENCOUNTER — SURGERY (OUTPATIENT)
Dept: URBAN - METROPOLITAN AREA SURGERY 19 | Facility: SURGERY | Age: 72
End: 2022-06-27
Payer: MEDICARE

## 2022-06-27 PROCEDURE — 66180 AQUEOUS SHUNT EYE W/GRAFT: CPT | Performed by: OPHTHALMOLOGY

## 2022-06-28 ENCOUNTER — POST-OPERATIVE VISIT (OUTPATIENT)
Dept: URBAN - METROPOLITAN AREA CLINIC 44 | Facility: CLINIC | Age: 72
End: 2022-06-28
Payer: MEDICARE

## 2022-06-28 DIAGNOSIS — Z48.810 ENCOUNTER FOR SURGICAL AFTERCARE FOLLOWING SURGERY ON A SENSE ORGAN: Primary | ICD-10-CM

## 2022-06-28 PROCEDURE — 99024 POSTOP FOLLOW-UP VISIT: CPT | Performed by: OPHTHALMOLOGY

## 2022-06-28 ASSESSMENT — INTRAOCULAR PRESSURE
OD: 28
OS: 18

## 2022-06-28 NOTE — IMPRESSION/PLAN
Impression: S/P Shunt:  Baerveldt OD - 1 Day. Encounter for surgical aftercare following surgery on a sense organ  Z48.810. Plan: - Doing well. Explained to patient that phaco/kdb os is an option after OD is healed. - Drop Instructions: START Prednisolone QID OD, Ofloxacin QID OD, CONTINUE Lumigan QHS OU, Dorzolamide BID OU, Timolol QAM OU, and Brimonidine BID OU.
- Discussed surgery in detail with patient. Post-Operative instructions reviewed with patient. Discussed activity restrictions including no bending head below waist, rubbing the eye, straining or lifting over 10 lbs. , stay out of deirdre, dirty areas and shield at night continue until further notice. Patient advised to call with any RSVP (redness, sensitivity to light, vision change, pain worse than today), call 24/7.
- RTC: as scheduled.

## 2022-07-07 ENCOUNTER — POST-OPERATIVE VISIT (OUTPATIENT)
Dept: URBAN - METROPOLITAN AREA CLINIC 44 | Facility: CLINIC | Age: 72
End: 2022-07-07
Payer: MEDICARE

## 2022-07-07 DIAGNOSIS — Z48.810 ENCOUNTER FOR SURGICAL AFTERCARE FOLLOWING SURGERY ON A SENSE ORGAN: Primary | ICD-10-CM

## 2022-07-07 PROCEDURE — 99024 POSTOP FOLLOW-UP VISIT: CPT | Performed by: OPHTHALMOLOGY

## 2022-07-07 ASSESSMENT — INTRAOCULAR PRESSURE
OS: 13
OD: 23

## 2022-07-07 NOTE — IMPRESSION/PLAN
Impression: S/P Shunt:  Baerveldt OD - 10 Days. Encounter for surgical aftercare following surgery on a sense organ  Z48.810. Plan: - Doing well. Explained to patient that phaco/kdb os is an option after OD is healed. - Drop Instructions: CONTINUE Prednisolone QID OD, Ofloxacin QID OD (for 1 more week), CONTINUE Lumigan QHS OU, Dorzolamide BID OU, Timolol QAM OU, and Brimonidine BID OU. Patient complains of irritation; after examination, looks to be due to stitch. Recommend patient to use QFT simon qhs.
- Activity restrictions still in place until further notice. Patient advised to call with any RSVP (redness, sensitivity to light, vision change, pain worse than today), call 24/7.
- RTC: as scheduled.

## 2022-07-18 ENCOUNTER — POST-OPERATIVE VISIT (OUTPATIENT)
Dept: URBAN - METROPOLITAN AREA CLINIC 44 | Facility: CLINIC | Age: 72
End: 2022-07-18
Payer: MEDICARE

## 2022-07-18 DIAGNOSIS — Z48.810 ENCOUNTER FOR SURGICAL AFTERCARE FOLLOWING SURGERY ON A SENSE ORGAN: Primary | ICD-10-CM

## 2022-07-18 PROCEDURE — 99024 POSTOP FOLLOW-UP VISIT: CPT | Performed by: OPHTHALMOLOGY

## 2022-07-18 ASSESSMENT — INTRAOCULAR PRESSURE
OD: 22
OS: 17

## 2022-07-18 NOTE — IMPRESSION/PLAN
Impression: S/P Shunt:  Baerveldt OD - 21 Days. Encounter for surgical aftercare following surgery on a sense organ  Z48.810. Plan: - IOP is doing okay, but expect it to get better as eye heals. Eye is slightly inflamed. - Drop Instructions: RESTART Prednisolone QD OD and CONTINUE Lumigan QHS OU, Dorzolamide BID OU, Timolol QAM OU, and Brimonidine BID OU. - Activity restrictions still in place until further notice. Patient advised to call with any RSVP (redness, sensitivity to light, vision change, pain worse than today), call 24/7.
- RTC: as scheduled.

## 2022-08-08 ENCOUNTER — POST-OPERATIVE VISIT (OUTPATIENT)
Dept: URBAN - METROPOLITAN AREA CLINIC 44 | Facility: CLINIC | Age: 72
End: 2022-08-08
Payer: MEDICARE

## 2022-08-08 DIAGNOSIS — Z48.810 ENCOUNTER FOR SURGICAL AFTERCARE FOLLOWING SURGERY ON A SENSE ORGAN: Primary | ICD-10-CM

## 2022-08-08 PROCEDURE — 99024 POSTOP FOLLOW-UP VISIT: CPT | Performed by: OPHTHALMOLOGY

## 2022-08-08 RX ORDER — PREDNISOLONE ACETATE 10 MG/ML
1 % SUSPENSION/ DROPS OPHTHALMIC
Qty: 10 | Refills: 1 | Status: ACTIVE
Start: 2022-08-08

## 2022-08-08 NOTE — IMPRESSION/PLAN
Impression: S/P Shunt:  Baerveldt OD - 42 Days. Encounter for surgical aftercare following surgery on a sense organ  Z48.810. Post operative instructions reviewed - Plan: Tube is open. - Drop Instructions: DISCONTINUE Lumigan, Brimonidine, Dorzolamide in the RIGHT EYE.  Will CONTINUE all other drops OS. 
- CONTINUE Timolol QAM OU
- START Pred QID OD
- Return in 2 weeks for PO IOP check

## 2022-09-01 ENCOUNTER — POST-OPERATIVE VISIT (OUTPATIENT)
Dept: URBAN - METROPOLITAN AREA CLINIC 44 | Facility: CLINIC | Age: 72
End: 2022-09-01
Payer: MEDICARE

## 2022-09-01 DIAGNOSIS — Z48.810 ENCOUNTER FOR SURGICAL AFTERCARE FOLLOWING SURGERY ON A SENSE ORGAN: Primary | ICD-10-CM

## 2022-09-01 PROCEDURE — 99024 POSTOP FOLLOW-UP VISIT: CPT | Performed by: OPHTHALMOLOGY

## 2022-09-01 ASSESSMENT — INTRAOCULAR PRESSURE
OS: 17
OD: 39

## 2022-09-01 NOTE — IMPRESSION/PLAN
Impression: S/P  Bleb Revision OD - 1178 Days. Encounter for surgical aftercare following surgery on a sense organ  Z48.810. Plan: IOP is elevated. Patient has not been taking Timolol. Will change drops. - Drop Instructions: CONTINUE Lumigan QHS OS, CHANGE Brimonidine BID from OS to OU, CHANGE Dorzolamide BID from OS to OU, Timolol BID OU, and TAPER Prednisolone from QID to TID OD. Will watch closely. No activity restrictions.  
RTC next week for PO

## 2022-09-08 ENCOUNTER — OFFICE VISIT (OUTPATIENT)
Dept: URBAN - METROPOLITAN AREA CLINIC 44 | Facility: CLINIC | Age: 72
End: 2022-09-08
Payer: MEDICARE

## 2022-09-08 DIAGNOSIS — Z48.810 ENCOUNTER FOR SURGICAL AFTERCARE FOLLOWING SURGERY ON A SENSE ORGAN: Primary | ICD-10-CM

## 2022-09-08 PROCEDURE — 99024 POSTOP FOLLOW-UP VISIT: CPT | Performed by: OPHTHALMOLOGY

## 2022-09-08 ASSESSMENT — INTRAOCULAR PRESSURE
OD: 21
OS: 12

## 2022-09-08 NOTE — IMPRESSION/PLAN
Impression: S/P  Bleb Revision OD - 1178 Days. Encounter for surgical aftercare following surgery on a sense organ  Z48.810. Plan: - IOP is improved. He is having some allergy to drops- recommend check IOP two weeks after PRed taper and if IOP ok try [address] one glaucoma drop at a time OD. Also could consider PF gtts. - CONTINUE Lumigan QHS OS,  Brimonidine BID OU, Dorzolamide BID OU, Timolol BID OU
- TAPER Prednisolone from BID x 1 wk then QD x 1 week then STOP. No activity restrictions.  
RTC 2 weeks for PO IOP  check with Glaucoma provider

## 2022-09-19 ENCOUNTER — POST-OPERATIVE VISIT (OUTPATIENT)
Dept: URBAN - METROPOLITAN AREA CLINIC 44 | Facility: CLINIC | Age: 72
End: 2022-09-19
Payer: MEDICARE

## 2022-09-19 DIAGNOSIS — Z48.810 ENCOUNTER FOR SURGICAL AFTERCARE FOLLOWING SURGERY ON A SENSE ORGAN: Primary | ICD-10-CM

## 2022-09-19 PROCEDURE — 99024 POSTOP FOLLOW-UP VISIT: CPT | Performed by: OPHTHALMOLOGY

## 2022-09-19 ASSESSMENT — INTRAOCULAR PRESSURE
OS: 17
OD: 17

## 2022-09-19 NOTE — IMPRESSION/PLAN
Impression: S/P  Tube Shunt OD - 1178 Days. Encounter for surgical aftercare following surgery on a sense organ  Z48.810. Plan: Pt has Glaucoma    Gonio :        Pachs:      Today's IOP :  17/17       Tmax  :  39/26 Target IOP low teens OU Pt denies Fhx of Glaucoma Left eye is the better seeing eye HVF 24-2 (07/08/21) small central island OD, inf loss OS 
C/D:  
OCT: 68/61 (09/20/17) Pt denies Sulfa Allergy   // Pt denies Lung /Heart dx Plan :
1. Continue Lumigan QHS OS Brimonidine BID OU Dorzolamide BID OU Timolol BID OU
- STOP Prednisolone OD Discussed details about Glaucoma and that without proper control of pressures irreversible blindness can occur. Patient understands risks. Emphasize compliance with drop and without compliance vision loss progression can occur. 2. IOP continues to improve but is not at goal 
3. IOP to continue to drop with stopping steroid - return in 1 month with optometry for IOP check and 2 months with DR. Sola Fletcher for RNFL/HVF OU

## 2022-10-19 ENCOUNTER — OFFICE VISIT (OUTPATIENT)
Dept: URBAN - METROPOLITAN AREA CLINIC 44 | Facility: CLINIC | Age: 72
End: 2022-10-19
Payer: MEDICARE

## 2022-10-19 DIAGNOSIS — Z48.810 ENCOUNTER FOR SURGICAL AFTERCARE FOLLOWING SURGERY ON A SENSE ORGAN: Primary | ICD-10-CM

## 2022-10-19 PROCEDURE — 99213 OFFICE O/P EST LOW 20 MIN: CPT | Performed by: OPTOMETRIST

## 2022-10-19 ASSESSMENT — INTRAOCULAR PRESSURE
OS: 12
OD: 14

## 2022-10-19 NOTE — IMPRESSION/PLAN
Impression: S/P  Tube Shunt OD - 1178 Days. Encounter for surgical aftercare following surgery on a sense organ  Z48.810. IOP 14, 12. IOL lower since D/C Pred. Doing well. Eye comfortable Plan: Pt has Glaucoma    Gonio :        Pachs:      Today's IOP :  17/17       Tmax  :  39/26 Target IOP low teens OU Pt denies Fhx of Glaucoma Left eye is the better seeing eye HVF 24-2 (07/08/21) small central island OD, inf loss OS 
C/D:  
OCT: 68/61 (09/20/17) Pt denies Sulfa Allergy   // Pt denies Lung /Heart dx Plan :
1. Continue Lumigan QHS OS, Brimonidine BID OU, Dorzolamide BID OU, Timolol BID OU. STOP Prednisolone OD. RTC as sched  with DR. Stephanie Guthrie for RNFL/HVF OU

## 2022-11-14 ENCOUNTER — OFFICE VISIT (OUTPATIENT)
Dept: URBAN - METROPOLITAN AREA CLINIC 44 | Facility: CLINIC | Age: 72
End: 2022-11-14
Payer: MEDICARE

## 2022-11-14 DIAGNOSIS — H40.1122 PRIMARY OPEN-ANGLE GLAUCOMA, MODERATE STAGE, LEFT EYE: ICD-10-CM

## 2022-11-14 DIAGNOSIS — H40.1113 PRIMARY OPEN-ANGLE GLAUCOMA, SEVERE STAGE, RIGHT EYE: Primary | ICD-10-CM

## 2022-11-14 DIAGNOSIS — Z88.9 ALLERGY STATUS TO MEDICATION: ICD-10-CM

## 2022-11-14 PROCEDURE — 99214 OFFICE O/P EST MOD 30 MIN: CPT | Performed by: OPHTHALMOLOGY

## 2022-11-14 PROCEDURE — 92083 EXTENDED VISUAL FIELD XM: CPT | Performed by: OPHTHALMOLOGY

## 2022-11-14 ASSESSMENT — INTRAOCULAR PRESSURE
OS: 11
OD: 11

## 2022-11-14 NOTE — IMPRESSION/PLAN
Impression: Primary open-angle glaucoma, moderate stage, left eye: H40.1122. Plan: See above assessment no

## 2022-11-14 NOTE — IMPRESSION/PLAN
Impression: Allergy status to medication: Z88.9. Plan: Patient presents with Dorzolamide allergy. Will have patient stop med. recommend Vaseline to lower lids as needed.

## 2022-11-14 NOTE — IMPRESSION/PLAN
Impression: Primary open-angle glaucoma, severe stage, right eye: H40.1113. Plan: Pt has Glaucoma    Gonio :        Pachs:      Today's IOP : 11 OU      Tmax  :  39/26 Target IOP low teens OU Pt denies Fhx of Glaucoma Left eye is the better seeing eye F 24-2 (07/08/21) small central island OD, inf loss OS 
C/D:  
OCT: 68/61 (09/20/17) Pt denies Sulfa Allergy   // Pt denies Lung /Heart dx Plan :
1. Continue Lumigan QHS OS Brimonidine BID OU Timolol BID OU
- STOP Dorzolamide  2/2 allergy Discussed details about Glaucoma and that without proper control of pressures irreversible blindness can occur. Patient understands risks. Emphasize compliance with drop and without compliance vision loss progression can occur. 
2. Return to Dr. Enoch Mendoza in 3 weeks for IOP check without dorzolamide (look for improvement on eyelids)

## 2022-12-06 ENCOUNTER — OFFICE VISIT (OUTPATIENT)
Dept: URBAN - METROPOLITAN AREA CLINIC 44 | Facility: CLINIC | Age: 72
End: 2022-12-06
Payer: MEDICARE

## 2022-12-06 DIAGNOSIS — H40.1113 PRIMARY OPEN-ANGLE GLAUCOMA, SEVERE STAGE, RIGHT EYE: Primary | ICD-10-CM

## 2022-12-06 DIAGNOSIS — Z88.9 ALLERGY STATUS TO MEDICATION: ICD-10-CM

## 2022-12-06 PROCEDURE — 99213 OFFICE O/P EST LOW 20 MIN: CPT | Performed by: OPTOMETRIST

## 2022-12-06 ASSESSMENT — INTRAOCULAR PRESSURE
OS: 21
OD: 22

## 2022-12-06 NOTE — IMPRESSION/PLAN
Impression: Allergy status to medication: Z88.9. Likely allergy to Dorzolamide. Lids better since D/C. Plan: PLAN: Continue with Vaseline to lower lids as needed.

## 2022-12-06 NOTE — IMPRESSION/PLAN
Impression: Primary open-angle glaucoma, severe stage, right eye: H40.1113. IOP= OD 21. OS 22. Tmax  :  39/26. Improvement of the lids OU. Pt denies Fhx of Glaucoma Left eye is the better seeing eye HVF 24-2 (07/08/21) small central island OD, inf loss OS 
C/D:  
OCT: 68/61 (09/20/17) Pt denies Sulfa Allergy   // Pt denies Lung /Heart dx Plan: PLAN: Discussed findings. IOP elevated OU. Target IOP low teens OU. Continue Lumigan QHS OS, Brimonidine BID OU, and Timolol BID OU.  RTC 4 weeks for IOP check

## 2023-01-03 ENCOUNTER — OFFICE VISIT (OUTPATIENT)
Dept: URBAN - METROPOLITAN AREA CLINIC 44 | Facility: CLINIC | Age: 73
End: 2023-01-03
Payer: MEDICARE

## 2023-01-03 DIAGNOSIS — H25.812 COMBINED FORMS OF AGE-RELATED CATARACT, LEFT EYE: ICD-10-CM

## 2023-01-03 DIAGNOSIS — H40.1122 PRIMARY OPEN-ANGLE GLAUCOMA, MODERATE STAGE, LEFT EYE: ICD-10-CM

## 2023-01-03 DIAGNOSIS — H40.1113 PRIMARY OPEN-ANGLE GLAUCOMA, SEVERE STAGE, RIGHT EYE: Primary | ICD-10-CM

## 2023-01-03 PROCEDURE — 99213 OFFICE O/P EST LOW 20 MIN: CPT | Performed by: OPTOMETRIST

## 2023-01-03 RX ORDER — BRIMONIDINE TARTRATE 2 MG/ML
0.2 % SOLUTION/ DROPS OPHTHALMIC
Qty: 15 | Refills: 3 | Status: ACTIVE
Start: 2023-01-03

## 2023-01-03 RX ORDER — BIMATOPROST 0.1 MG/ML
0.01 % SOLUTION/ DROPS OPHTHALMIC
Qty: 7.5 | Refills: 3 | Status: ACTIVE
Start: 2023-01-03

## 2023-01-03 RX ORDER — DORZOLAMIDE HCL 20 MG/ML
2 % SOLUTION/ DROPS OPHTHALMIC
Qty: 15 | Refills: 3 | Status: INACTIVE
Start: 2023-01-03 | End: 2023-01-03

## 2023-01-03 RX ORDER — TIMOLOL MALEATE 5 MG/ML
0.5 % SOLUTION/ DROPS OPHTHALMIC
Qty: 15 | Refills: 3 | Status: ACTIVE
Start: 2023-01-03

## 2023-01-03 ASSESSMENT — INTRAOCULAR PRESSURE
OD: 12
OS: 16

## 2023-01-03 ASSESSMENT — VISUAL ACUITY
OD: 20/300
OS: 20/40

## 2023-01-03 NOTE — IMPRESSION/PLAN
Impression: Combined forms of age-related cataract, left eye: H25.812. BCVA limited due to Cat OS Plan: PLAN: Continue to observe condition. RTC 4 months for complete exam complete + OCT (Mac) to reaccessed cataracts. RTC sooner if patient symptoms become worse.

## 2023-01-03 NOTE — IMPRESSION/PLAN
Impression: Primary open-angle glaucoma, severe stage, right eye: H40.1113. IOP= 12 Tmax: 39.
- Vertical cupping OD . 84 OD with RNFL loss (S,I), Average OD 68
-VF OD total loss (VFI 13% 11/22),
-No Fm Hx, Plan: PLAN: Target IOP low teens OU. Continue Lumigan QHS OS, Brimonidine BID OU, and Timolol BID OU.  RTC 4 months for 24-2 VF and complete + RNFL

## 2023-01-03 NOTE — IMPRESSION/PLAN
Impression: Primary open-angle glaucoma, moderate stage, left eye: H40.1122. IOP 16. TMax 26 - Vertical cupping OS . 81 OS with RNFL loss (S,I) Average OS 61 
-VF OS dense inf/nasal step defects (VFI 42% 11/22),
-No Fm Hx, Plan: See above assessment

## 2023-05-03 ENCOUNTER — OFFICE VISIT (OUTPATIENT)
Dept: URBAN - METROPOLITAN AREA CLINIC 44 | Facility: CLINIC | Age: 73
End: 2023-05-03
Payer: MEDICARE

## 2023-05-03 DIAGNOSIS — H40.1113 PRIMARY OPEN-ANGLE GLAUCOMA, SEVERE STAGE, RIGHT EYE: Primary | ICD-10-CM

## 2023-05-03 DIAGNOSIS — H26.491 OTHER SECONDARY CATARACT, RIGHT EYE: ICD-10-CM

## 2023-05-03 DIAGNOSIS — H40.1122 PRIMARY OPEN-ANGLE GLAUCOMA, MODERATE STAGE, LEFT EYE: ICD-10-CM

## 2023-05-03 DIAGNOSIS — H35.351 CYSTOID MACULAR DEGENERATION, RIGHT EYE: ICD-10-CM

## 2023-05-03 DIAGNOSIS — H25.812 COMBINED FORMS OF AGE-RELATED CATARACT, LEFT EYE: ICD-10-CM

## 2023-05-03 DIAGNOSIS — H04.123 TEAR FILM INSUFFICIENCY OF BILATERAL LACRIMAL GLANDS: ICD-10-CM

## 2023-05-03 PROCEDURE — 92083 EXTENDED VISUAL FIELD XM: CPT | Performed by: OPTOMETRIST

## 2023-05-03 PROCEDURE — 99214 OFFICE O/P EST MOD 30 MIN: CPT | Performed by: OPTOMETRIST

## 2023-05-03 PROCEDURE — 92133 CPTRZD OPH DX IMG PST SGM ON: CPT | Performed by: OPTOMETRIST

## 2023-05-03 RX ORDER — BIMATOPROST 0.1 MG/ML
0.01 % SOLUTION/ DROPS OPHTHALMIC
Qty: 7.5 | Refills: 3 | Status: ACTIVE
Start: 2023-05-03

## 2023-05-03 RX ORDER — BRIMONIDINE TARTRATE 2 MG/ML
0.2 % SOLUTION/ DROPS OPHTHALMIC
Qty: 15 | Refills: 3 | Status: ACTIVE
Start: 2023-05-03

## 2023-05-03 RX ORDER — TIMOLOL MALEATE 5 MG/ML
0.5 % SOLUTION/ DROPS OPHTHALMIC
Qty: 15 | Refills: 3 | Status: ACTIVE
Start: 2023-05-03

## 2023-05-03 ASSESSMENT — INTRAOCULAR PRESSURE
OD: 17
OS: 17

## 2023-05-03 ASSESSMENT — VISUAL ACUITY
OD: CF 3FT
OS: 20/30

## 2023-05-03 ASSESSMENT — KERATOMETRY
OS: 45.88
OD: 44.88

## 2023-05-03 NOTE — IMPRESSION/PLAN
Impression: Primary open-angle glaucoma, moderate stage, left eye: H40.1122. IOP 17 TMax 26 - Vertical cupping OS . 81 OS with RNFL loss (S,I) Average OS 61 
-VF OS total loss sparing sup (VFI 36% 5/23 vs 42% 11/22),
-No Fm Hx, Plan: See above assessment

## 2023-05-03 NOTE — IMPRESSION/PLAN
Impression: Cystoid macular degeneration, right eye: H35.351. Decreased VA. CRVO?? Plan: PLAN: Discussed findings. Rec retinal consult.

## 2023-05-03 NOTE — IMPRESSION/PLAN
Impression: Tear film insufficiency of bilateral lacrimal glands: H04.123. Plan: PLAN: Recommend Lipid based tears to be used 3-4X daily if symptomatic. PRN if no symptoms. Observe condition and RTC if symptom's worsen for further work up.

## 2023-05-03 NOTE — IMPRESSION/PLAN
Impression: Primary open-angle glaucoma, severe stage, right eye: H40.1113. IOP= 17 Tmax: 39.
- Vertical cupping OD . 76 OD with RNFL loss (i), Average  NOTE: Increase RNFL vs 9/20/17 likely due to CME
-VF OD total loss (VFI 9% 5/23 vs 13% 11/22),
-No Fm Hx, Plan: PLAN: Target IOP low teens OU. Continue Lumigan QHS OS, Brimonidine BID OU, and Timolol BID OU. Pt reporting dry skin around eyes and redness when using drops OU.  RTC for glaucoma consult due to IOP higher than target and possible sensitivity  to medications

## 2023-05-03 NOTE — IMPRESSION/PLAN
Impression: Combined forms of age-related cataract, left eye: H25.812. Visually significant/symptomatic cataracts. Patient reporting difficulty with PM vision and/or reading. BCVA OS CF. Glare OS CF. Plan: PLAN: Discussed findings and reviewed treatment options. Rec CE/IOL to improve vision. Patient elects to proceed with surgical treatment. Risks and benefits to be discussed by surgeon. Recommend surgery OS. Pupils dilated to 6 mm or greater. Patient candidate  for the following: Std IOL, toric, LenSx, ORA. Refractive goal to be discussed with surgeon. Patient understands they may need correction to achieve best vision. BCVA maybe limited by DED, floaters and severe glaucoma. Consider MIGs.  Rec Dr Pramod Rogers for CE/IOL

## 2023-05-03 NOTE — IMPRESSION/PLAN
Impression: Other secondary cataract, right eye: H26.491. Mild to moderate PCO.  BCVA limited Plan: PLAN: Observe

## 2023-11-10 ENCOUNTER — OFFICE VISIT (OUTPATIENT)
Dept: URBAN - METROPOLITAN AREA CLINIC 44 | Facility: CLINIC | Age: 73
End: 2023-11-10
Payer: MEDICARE

## 2023-11-10 DIAGNOSIS — H35.371 PUCKERING OF MACULA, RIGHT EYE: ICD-10-CM

## 2023-11-10 DIAGNOSIS — H40.1113 PRIMARY OPEN-ANGLE GLAUCOMA, SEVERE STAGE, RIGHT EYE: Primary | ICD-10-CM

## 2023-11-10 DIAGNOSIS — H25.812 COMBINED FORMS OF AGE-RELATED CATARACT, LEFT EYE: ICD-10-CM

## 2023-11-10 DIAGNOSIS — H40.1122 PRIMARY OPEN-ANGLE GLAUCOMA, MODERATE STAGE, LEFT EYE: ICD-10-CM

## 2023-11-10 PROCEDURE — 99214 OFFICE O/P EST MOD 30 MIN: CPT | Performed by: OPTOMETRIST

## 2023-11-10 PROCEDURE — 92133 CPTRZD OPH DX IMG PST SGM ON: CPT | Performed by: OPTOMETRIST

## 2023-11-10 ASSESSMENT — INTRAOCULAR PRESSURE
OS: 17
OD: 27

## 2024-01-29 ENCOUNTER — OFFICE VISIT (OUTPATIENT)
Dept: URBAN - METROPOLITAN AREA CLINIC 44 | Facility: CLINIC | Age: 74
End: 2024-01-29
Payer: MEDICARE

## 2024-01-29 DIAGNOSIS — H40.1122 PRIMARY OPEN-ANGLE GLAUCOMA, MODERATE STAGE, LEFT EYE: ICD-10-CM

## 2024-01-29 DIAGNOSIS — H40.1113 PRIMARY OPEN-ANGLE GLAUCOMA, SEVERE STAGE, RIGHT EYE: Primary | ICD-10-CM

## 2024-01-29 PROCEDURE — 99214 OFFICE O/P EST MOD 30 MIN: CPT | Performed by: OPHTHALMOLOGY

## 2024-01-29 PROCEDURE — 92020 GONIOSCOPY: CPT | Performed by: OPHTHALMOLOGY

## 2024-01-29 ASSESSMENT — INTRAOCULAR PRESSURE
OD: 20
OS: 19

## 2024-02-05 ENCOUNTER — TECH ONLY (OUTPATIENT)
Dept: URBAN - METROPOLITAN AREA CLINIC 44 | Facility: CLINIC | Age: 74
End: 2024-02-05
Payer: MEDICARE

## 2024-02-05 DIAGNOSIS — H25.12 AGE-RELATED NUCLEAR CATARACT, LEFT EYE: ICD-10-CM

## 2024-02-05 DIAGNOSIS — H25.812 COMBINED FORMS OF AGE-RELATED CATARACT, LEFT EYE: Primary | ICD-10-CM

## 2024-02-05 DIAGNOSIS — H40.1133 PRIMARY OPEN-ANGLE GLAUCOMA, BILATERAL, SEVERE STAGE: ICD-10-CM

## 2024-02-05 DIAGNOSIS — Z01.818 ENCOUNTER FOR OTHER PREPROCEDURAL EXAMINATION: Primary | ICD-10-CM

## 2024-02-05 PROCEDURE — 99213 OFFICE O/P EST LOW 20 MIN: CPT | Performed by: PHYSICIAN ASSISTANT

## 2024-02-05 ASSESSMENT — PACHYMETRY
OD: 5.25
OS: 23.34
OD: 23.52
OS: 3.11

## 2024-02-12 ENCOUNTER — SURGERY (OUTPATIENT)
Dept: URBAN - METROPOLITAN AREA SURGERY 19 | Facility: SURGERY | Age: 74
End: 2024-02-12
Payer: MEDICARE

## 2024-02-12 RX ORDER — OFLOXACIN 3 MG/ML
0.3 % SOLUTION/ DROPS OPHTHALMIC
Qty: 5 | Refills: 1 | Status: ACTIVE
Start: 2024-02-12

## 2024-02-12 RX ORDER — PREDNISOLONE ACETATE 10 MG/ML
1 % SUSPENSION/ DROPS OPHTHALMIC
Qty: 5 | Refills: 1 | Status: ACTIVE
Start: 2024-02-12

## 2024-02-13 ENCOUNTER — POST-OPERATIVE VISIT (OUTPATIENT)
Dept: URBAN - METROPOLITAN AREA CLINIC 44 | Facility: CLINIC | Age: 74
End: 2024-02-13
Payer: MEDICARE

## 2024-02-13 DIAGNOSIS — Z48.810 ENCOUNTER FOR SURGICAL AFTERCARE FOLLOWING SURGERY ON A SENSE ORGAN: Primary | ICD-10-CM

## 2024-02-13 PROCEDURE — 99024 POSTOP FOLLOW-UP VISIT: CPT | Performed by: OPTOMETRIST

## 2024-02-13 ASSESSMENT — INTRAOCULAR PRESSURE: OD: 20

## 2024-02-19 ENCOUNTER — OFFICE VISIT (OUTPATIENT)
Dept: URBAN - METROPOLITAN AREA CLINIC 44 | Facility: CLINIC | Age: 74
End: 2024-02-19
Payer: MEDICARE

## 2024-02-19 DIAGNOSIS — H40.1122 PRIMARY OPEN-ANGLE GLAUCOMA, MODERATE STAGE, LEFT EYE: ICD-10-CM

## 2024-02-19 DIAGNOSIS — H40.1113 PRIMARY OPEN-ANGLE GLAUCOMA, SEVERE STAGE, RIGHT EYE: Primary | ICD-10-CM

## 2024-02-19 DIAGNOSIS — Z48.810 ENCOUNTER FOR SURGICAL AFTERCARE FOLLOWING SURGERY ON A SENSE ORGAN: ICD-10-CM

## 2024-02-19 DIAGNOSIS — H25.812 COMBINED FORMS OF AGE-RELATED CATARACT, LEFT EYE: ICD-10-CM

## 2024-02-19 PROCEDURE — 99214 OFFICE O/P EST MOD 30 MIN: CPT | Performed by: OPHTHALMOLOGY

## 2024-02-19 PROCEDURE — 99024 POSTOP FOLLOW-UP VISIT: CPT | Performed by: OPHTHALMOLOGY

## 2024-02-19 ASSESSMENT — INTRAOCULAR PRESSURE
OD: 18
OS: 18

## 2024-03-05 ENCOUNTER — POST-OPERATIVE VISIT (OUTPATIENT)
Dept: URBAN - METROPOLITAN AREA CLINIC 44 | Facility: CLINIC | Age: 74
End: 2024-03-05
Payer: MEDICARE

## 2024-03-05 DIAGNOSIS — Z96.1 PRESENCE OF INTRAOCULAR LENS: Primary | ICD-10-CM

## 2024-03-05 PROCEDURE — 99024 POSTOP FOLLOW-UP VISIT: CPT | Performed by: OPTOMETRIST

## 2024-03-05 ASSESSMENT — INTRAOCULAR PRESSURE
OS: 23
OS: 22

## 2024-04-01 ENCOUNTER — POST-OPERATIVE VISIT (OUTPATIENT)
Dept: URBAN - METROPOLITAN AREA CLINIC 44 | Facility: CLINIC | Age: 74
End: 2024-04-01
Payer: MEDICARE

## 2024-04-01 DIAGNOSIS — H52.4 PRESBYOPIA: Primary | ICD-10-CM

## 2024-04-01 DIAGNOSIS — Z96.1 PRESENCE OF INTRAOCULAR LENS: ICD-10-CM

## 2024-04-01 PROCEDURE — 99024 POSTOP FOLLOW-UP VISIT: CPT | Performed by: OPTOMETRIST

## 2024-04-01 ASSESSMENT — KERATOMETRY
OS: 45.75
OD: 45.13

## 2024-04-01 ASSESSMENT — VISUAL ACUITY
OD: 20/50
OS: 20/25

## 2024-04-01 ASSESSMENT — INTRAOCULAR PRESSURE
OD: 22
OS: 18

## 2024-08-29 ENCOUNTER — OFFICE VISIT (OUTPATIENT)
Dept: URBAN - METROPOLITAN AREA CLINIC 44 | Facility: CLINIC | Age: 74
End: 2024-08-29
Payer: MEDICARE

## 2024-08-29 DIAGNOSIS — H40.1113 PRIMARY OPEN-ANGLE GLAUCOMA, SEVERE STAGE, RIGHT EYE: Primary | ICD-10-CM

## 2024-08-29 DIAGNOSIS — H40.1122 PRIMARY OPEN-ANGLE GLAUCOMA, MODERATE STAGE, LEFT EYE: ICD-10-CM

## 2024-08-29 PROCEDURE — 99214 OFFICE O/P EST MOD 30 MIN: CPT | Performed by: OPHTHALMOLOGY

## 2024-08-29 RX ORDER — PREDNISOLONE ACETATE 10 MG/ML
1 % SUSPENSION/ DROPS OPHTHALMIC
Qty: 10 | Refills: 1 | Status: ACTIVE
Start: 2024-08-29

## 2024-08-29 RX ORDER — OFLOXACIN 3 MG/ML
0.3 % SOLUTION/ DROPS OPHTHALMIC
Qty: 10 | Refills: 1 | Status: ACTIVE
Start: 2024-08-29

## 2024-08-29 ASSESSMENT — INTRAOCULAR PRESSURE
OD: 19
OS: 22

## 2024-10-01 ENCOUNTER — ADULT PHYSICAL (OUTPATIENT)
Dept: URBAN - METROPOLITAN AREA CLINIC 44 | Facility: CLINIC | Age: 74
End: 2024-10-01
Payer: MEDICARE

## 2024-10-01 DIAGNOSIS — H40.1133 PRIMARY OPEN-ANGLE GLAUCOMA, BILATERAL, SEVERE STAGE: ICD-10-CM

## 2024-10-01 DIAGNOSIS — Z01.818 ENCOUNTER FOR OTHER PREPROCEDURAL EXAMINATION: Primary | ICD-10-CM

## 2024-10-01 PROCEDURE — 99213 OFFICE O/P EST LOW 20 MIN: CPT | Performed by: PHYSICIAN ASSISTANT

## 2024-10-14 ENCOUNTER — SURGERY (OUTPATIENT)
Dept: URBAN - METROPOLITAN AREA SURGERY 19 | Facility: SURGERY | Age: 74
End: 2024-10-14
Payer: MEDICARE

## 2024-10-15 ENCOUNTER — POST-OPERATIVE VISIT (OUTPATIENT)
Dept: URBAN - METROPOLITAN AREA CLINIC 44 | Facility: CLINIC | Age: 74
End: 2024-10-15
Payer: MEDICARE

## 2024-10-15 DIAGNOSIS — Z48.810 ENCOUNTER FOR SURGICAL AFTERCARE FOLLOWING SURGERY ON A SENSE ORGAN: Primary | ICD-10-CM

## 2024-10-15 PROCEDURE — 99024 POSTOP FOLLOW-UP VISIT: CPT | Performed by: OPTOMETRIST

## 2024-10-15 ASSESSMENT — INTRAOCULAR PRESSURE
OD: 7
OD: 10

## 2024-10-22 ENCOUNTER — POST-OPERATIVE VISIT (OUTPATIENT)
Dept: URBAN - METROPOLITAN AREA CLINIC 44 | Facility: CLINIC | Age: 74
End: 2024-10-22
Payer: MEDICARE

## 2024-10-22 DIAGNOSIS — Z48.810 ENCOUNTER FOR SURGICAL AFTERCARE FOLLOWING SURGERY ON A SENSE ORGAN: Primary | ICD-10-CM

## 2024-10-22 PROCEDURE — 99024 POSTOP FOLLOW-UP VISIT: CPT | Performed by: OPTOMETRIST

## 2024-10-22 ASSESSMENT — INTRAOCULAR PRESSURE
OS: 19
OD: 20
OS: 17

## 2024-11-04 ENCOUNTER — SURGERY (OUTPATIENT)
Dept: URBAN - METROPOLITAN AREA SURGERY 19 | Facility: SURGERY | Age: 74
End: 2024-11-04
Payer: MEDICARE

## 2024-11-05 ENCOUNTER — POST-OPERATIVE VISIT (OUTPATIENT)
Dept: URBAN - METROPOLITAN AREA CLINIC 44 | Facility: CLINIC | Age: 74
End: 2024-11-05
Payer: MEDICARE

## 2024-11-05 DIAGNOSIS — Z48.810 ENCOUNTER FOR SURGICAL AFTERCARE FOLLOWING SURGERY ON A SENSE ORGAN: Primary | ICD-10-CM

## 2024-11-05 PROCEDURE — 99024 POSTOP FOLLOW-UP VISIT: CPT | Performed by: OPTOMETRIST

## 2024-11-05 ASSESSMENT — INTRAOCULAR PRESSURE
OS: 10
OS: 14

## 2024-11-08 ENCOUNTER — POST-OPERATIVE VISIT (OUTPATIENT)
Dept: URBAN - METROPOLITAN AREA CLINIC 44 | Facility: CLINIC | Age: 74
End: 2024-11-08
Payer: MEDICARE

## 2024-11-08 PROCEDURE — 99024 POSTOP FOLLOW-UP VISIT: CPT | Performed by: OPTOMETRIST

## 2024-11-08 ASSESSMENT — INTRAOCULAR PRESSURE
OD: 20
OS: 12

## 2024-12-02 ENCOUNTER — POST-OPERATIVE VISIT (OUTPATIENT)
Dept: URBAN - METROPOLITAN AREA CLINIC 44 | Facility: CLINIC | Age: 74
End: 2024-12-02
Payer: MEDICARE

## 2024-12-02 DIAGNOSIS — H40.1113 PRIMARY OPEN-ANGLE GLAUCOMA, SEVERE STAGE, RIGHT EYE: Primary | ICD-10-CM

## 2024-12-02 DIAGNOSIS — H40.1122 PRIMARY OPEN-ANGLE GLAUCOMA, MODERATE STAGE, LEFT EYE: ICD-10-CM

## 2024-12-02 PROCEDURE — 99024 POSTOP FOLLOW-UP VISIT: CPT | Performed by: OPHTHALMOLOGY

## 2024-12-02 ASSESSMENT — INTRAOCULAR PRESSURE
OS: 24
OD: 17

## 2025-01-02 ENCOUNTER — OFFICE VISIT (OUTPATIENT)
Dept: URBAN - METROPOLITAN AREA CLINIC 44 | Facility: CLINIC | Age: 75
End: 2025-01-02
Payer: MEDICARE

## 2025-01-02 DIAGNOSIS — H40.1122 PRIMARY OPEN-ANGLE GLAUCOMA, MODERATE STAGE, LEFT EYE: ICD-10-CM

## 2025-01-02 DIAGNOSIS — H40.1113 PRIMARY OPEN-ANGLE GLAUCOMA, RIGHT EYE, SEVERE STAGE: Primary | ICD-10-CM

## 2025-01-02 PROCEDURE — 92012 INTRM OPH EXAM EST PATIENT: CPT | Performed by: OPTOMETRIST

## 2025-01-02 PROCEDURE — 92133 CPTRZD OPH DX IMG PST SGM ON: CPT | Performed by: OPTOMETRIST

## 2025-01-02 ASSESSMENT — INTRAOCULAR PRESSURE
OD: 18
OS: 21
OS: 19
OD: 21

## 2025-03-03 ENCOUNTER — OFFICE VISIT (OUTPATIENT)
Dept: URBAN - METROPOLITAN AREA CLINIC 44 | Facility: CLINIC | Age: 75
End: 2025-03-03
Payer: MEDICARE

## 2025-03-03 DIAGNOSIS — H40.1113 PRIMARY OPEN-ANGLE GLAUCOMA, RIGHT EYE, SEVERE STAGE: Primary | ICD-10-CM

## 2025-03-03 ASSESSMENT — INTRAOCULAR PRESSURE
OD: 16
OS: 16

## 2025-03-17 ENCOUNTER — OFFICE VISIT (OUTPATIENT)
Dept: URBAN - METROPOLITAN AREA CLINIC 44 | Facility: CLINIC | Age: 75
End: 2025-03-17
Payer: MEDICARE

## 2025-03-17 DIAGNOSIS — H40.1113 PRIMARY OPEN-ANGLE GLAUCOMA, RIGHT EYE, SEVERE STAGE: Primary | ICD-10-CM

## 2025-03-17 DIAGNOSIS — H40.1122 PRIMARY OPEN-ANGLE GLAUCOMA, LEFT EYE, MODERATE STAGE: ICD-10-CM

## 2025-03-17 PROCEDURE — 66030 INJECTION TREATMENT OF EYE: CPT | Performed by: OPHTHALMOLOGY

## 2025-04-24 ENCOUNTER — OFFICE VISIT (OUTPATIENT)
Dept: URBAN - METROPOLITAN AREA CLINIC 44 | Facility: CLINIC | Age: 75
End: 2025-04-24
Payer: MEDICARE

## 2025-04-24 DIAGNOSIS — H40.1113 PRIMARY OPEN-ANGLE GLAUCOMA, RIGHT EYE, SEVERE STAGE: Primary | ICD-10-CM

## 2025-04-24 PROCEDURE — 92133 CPTRZD OPH DX IMG PST SGM ON: CPT | Performed by: OPTOMETRIST

## 2025-04-24 PROCEDURE — 92012 INTRM OPH EXAM EST PATIENT: CPT | Performed by: OPTOMETRIST

## 2025-04-24 ASSESSMENT — INTRAOCULAR PRESSURE
OD: 21
OS: 19

## 2025-06-23 ENCOUNTER — OFFICE VISIT (OUTPATIENT)
Dept: URBAN - METROPOLITAN AREA CLINIC 44 | Facility: CLINIC | Age: 75
End: 2025-06-23
Payer: MEDICARE

## 2025-06-23 DIAGNOSIS — H40.1113 PRIMARY OPEN-ANGLE GLAUCOMA, SEVERE STAGE, RIGHT EYE: Primary | ICD-10-CM

## 2025-06-23 DIAGNOSIS — H40.1122 PRIMARY OPEN-ANGLE GLAUCOMA, MODERATE STAGE, LEFT EYE: ICD-10-CM

## 2025-06-23 PROCEDURE — 99214 OFFICE O/P EST MOD 30 MIN: CPT | Performed by: OPHTHALMOLOGY

## 2025-06-23 PROCEDURE — 92083 EXTENDED VISUAL FIELD XM: CPT | Performed by: OPHTHALMOLOGY

## 2025-06-23 ASSESSMENT — INTRAOCULAR PRESSURE
OD: 17
OS: 16